# Patient Record
Sex: FEMALE | Race: WHITE
[De-identification: names, ages, dates, MRNs, and addresses within clinical notes are randomized per-mention and may not be internally consistent; named-entity substitution may affect disease eponyms.]

---

## 2018-01-23 ENCOUNTER — HOSPITAL ENCOUNTER (EMERGENCY)
Dept: HOSPITAL 25 - UCCORT | Age: 83
Discharge: HOME | End: 2018-01-23
Payer: MEDICARE

## 2018-01-23 VITALS — DIASTOLIC BLOOD PRESSURE: 79 MMHG | SYSTOLIC BLOOD PRESSURE: 150 MMHG

## 2018-01-23 DIAGNOSIS — I51.9: ICD-10-CM

## 2018-01-23 DIAGNOSIS — I10: Primary | ICD-10-CM

## 2018-01-23 DIAGNOSIS — Z87.891: ICD-10-CM

## 2018-01-23 DIAGNOSIS — F41.9: ICD-10-CM

## 2018-01-23 DIAGNOSIS — Z72.89: ICD-10-CM

## 2018-01-23 PROCEDURE — 93005 ELECTROCARDIOGRAM TRACING: CPT

## 2018-01-23 PROCEDURE — 99201: CPT

## 2018-01-23 PROCEDURE — G0463 HOSPITAL OUTPT CLINIC VISIT: HCPCS

## 2018-01-23 NOTE — UC
UC General HPI





- HPI Summary


HPI Summary: 





elevated blood pressure x 2 day


not feeling well, 


no cough , no nasal congestion , + headaches 


no n/v/d/c , no urinary sx


pt is anxious about the elevation of her bp  





- History of Current Complaint


Chief Complaint: UCGeneralIllness


Stated Complaint: HIGH BP,CHILLS


Time Seen by Provider: 18 17:03


Hx Obtained From: Patient


Onset/Duration: Gradual Onset, Lasting Days - 2, Still Present


Timing: Constant


Onset Severity: Moderate


Current Severity: Moderate


Associated Signs & Symptoms: Positive: Headache, Palpitations, Weakness.  

Negative: Agitation, Abdominal Pain, Anticoagulation Therapy, Back Pain, 

Confusion, Cough, Chest Pain, Decreased Responsiveness, Dizziness, Diarrhea, 

Dysuria, Decreased Oral Intake, Diaphoresis, Edema, Fever, Hematemesis, 

Hemoptysis, Immunocompromised, In-Dwelling Medication Device, Melena, Nausea, 

Recent Medication Changes, Syncope, SOB, Trauma, Vomiting, Wheezing





- Allergy/Home Medications


Home Medications: 


 Home Medications





Aspirin TAB* [Aspirin 325 MG TAB*] 325 mg PO ONCE 18 [History Confirmed ]


Aspirin [Aspirin 81 MG TAB] 81 mg PO BEDTIME 18 [History Confirmed ]


Atenolol TAB* [Tenormin TAB* 25 MG] 12.5 mg PO BEDTIME 18 [History 

Confirmed 18]


Losartan TAB* [Cozaar TAB*] 50 mg PO DAILY 18 [History Confirmed 18]


Sertraline* [Zoloft*] 25 mg PO DAILY 18 [History Confirmed 18]


Simvastatin [Zocor 5 MG-] 5 mg PO BEDTIME 18 [History Confirmed 18]


amLODIPine TAB* [Norvasc 5 mg TAB*] 5 mg PO DAILY 18 [History Confirmed ]











PMH/Surg Hx/FS Hx/Imm Hx


Cardiovascular History: Cardiac Disease, Hypertension


Psychological History: Anxiety





- Surgical History


Surgical History: Yes


Surgery Procedure, Year, and Place: tonsillectomy.   x2





- Social History


Alcohol Use: Occasionally


Substance Use Type: None


Smoking Status (MU): Former Smoker


When Did the Patient Quit Smoking/Using Tobacco: 50+ years ago





- Immunization History


Most Recent Influenza Vaccination: 5675-1834





Review of Systems


Constitutional: Negative


Skin: Negative


Eyes: Negative


ENT: Negative


Respiratory: Negative


Cardiovascular: Negative


Is Patient Immunocompromised?: No


All Other Systems Reviewed And Are Negative: Yes





Physical Exam


Triage Information Reviewed: Yes


Appearance: Well-Appearing, No Pain Distress, Well-Nourished


Vital Signs: 


 Initial Vital Signs











Temp  97.9 F   18 16:51


 


Pulse  72   18 16:51


 


Resp  17   18 16:51


 


BP  150/79   18 16:51


 


Pulse Ox  98   18 16:51











Vital Signs Reviewed: Yes


Eyes: Positive: Conjunctiva Clear


ENT: Positive: Normal ENT inspection, Hearing grossly normal, Pharynx normal


Neck: Positive: Supple, Nontender, No Lymphadenopathy


Respiratory: Positive: Chest non-tender, Lungs clear, Normal breath sounds, No 

respiratory distress


Cardiovascular: Positive: RRR, No Murmur, Pulses Normal


Abdominal Exam: Normal


Abdomen Description: Positive: Nontender, Soft.  Negative: CVA Tenderness (R), 

CVA Tenderness (L), Distended, Guarding


Bowel Sounds: Positive: Present


Musculoskeletal: Positive: Strength Intact, ROM Intact, No Edema


Neurological: Positive: Alert, Muscle Tone Normal


Skin Exam: Normal





Course/Dx





- Differential Dx - Multi-Symptom


Provider Diagnoses: hypertension





Discharge





- Discharge Plan


Condition: Stable


Disposition: HOME


Patient Education Materials:  Hypertension in the Older Adult (ED)


Referrals: 


No Primary Care Phys,NOPCP [Primary Care Provider] - 


Additional Instructions: 


elevated blood pressure , may be due to current viral illness


cont. with your current medication ,


follow up with your pcp in one week